# Patient Record
Sex: MALE | Race: WHITE
[De-identification: names, ages, dates, MRNs, and addresses within clinical notes are randomized per-mention and may not be internally consistent; named-entity substitution may affect disease eponyms.]

---

## 2018-07-02 ENCOUNTER — HOSPITAL ENCOUNTER (EMERGENCY)
Dept: HOSPITAL 80 - FED | Age: 68
Discharge: HOME | End: 2018-07-02
Payer: COMMERCIAL

## 2018-07-02 VITALS — SYSTOLIC BLOOD PRESSURE: 154 MMHG | DIASTOLIC BLOOD PRESSURE: 91 MMHG

## 2018-07-02 DIAGNOSIS — I48.91: ICD-10-CM

## 2018-07-02 DIAGNOSIS — Z79.01: ICD-10-CM

## 2018-07-02 DIAGNOSIS — I10: ICD-10-CM

## 2018-07-02 DIAGNOSIS — R55: Primary | ICD-10-CM

## 2018-07-02 DIAGNOSIS — E86.9: ICD-10-CM

## 2018-07-02 LAB — PLATELET # BLD: 204 10^3/UL (ref 150–400)

## 2018-07-02 NOTE — EDPHY
HPI/HX/ROS/PE/MDM


Narrative: 





CHIEF COMPLAINT: Near-syncope, atrial fib/flutter





HPI: The patient is an anticoagulated 68 y/o male with a history of atrial 

fibrillation arriving with his wife for evaluation of a near syncopal event 

this evening. He is visiting with his family from [place], [place] and went on a 3-

mile hike around the [facility] this morning without issue. This evening while 

in the hot tub, he began to feel lightheaded and like he would faint. His son 

told EMS the patient looked suddenly gray and the son pulled him out of the hot 

tub and helped him lie flat. The patient then felt diffusely weak and vomited 

once. EMS noted his rhythm was shifting between atrial fibrillation and flutter 

throughout transport. Upon arrival here, the patient feels completely back to 

baseline. He denies nausea, chest pain, dyspnea, fever, cough, urinary symptoms

, recent illness, recent trauma or any other complaints. He has never had 

symptoms like this previously. He reports he is complaint with his medications. 





REVIEW OF SYSTEMS:


Aside from elements discussed in the HPI, a comprehensive 10-point review of 

systems was reviewed and is negative.





PMH: Intermittent atrial fibrillation - Eliquis; hypertension - metoprolol





SOCIAL HISTORY: Wife at bedside. Nonsmoker. Visiting from [place], [place].





PHYSICAL EXAM:


General:Patient is alert, in no acute distress.


ENT:Eyes are normal to inspection.  ENT inspection normal.


Neck: Normal inspection.  Full range of motion.


Respiratory:No respiratory distress.  Breath sounds normal bilaterally.


Cardiovascular: Regular rate and rhythm.  Strong peripheral pulses.  Normal cap 

refill.


Abdomen:The abdomen is nontender to palpation. There are no peritoneal signs.


Back: Normal to inspection.  No tenderness to palpation.


Skin: Normal color.  No rash.  Warm and dry.


Extremities: Normal appearance.  Full range of motion.


Neuro: Oriented x3.  Normal motor function.  Normal sensory function.


ED Course: 


This is a 67 y/o male with a history of atrial fibrillation who is visiting 

from [place] and presents after a near-syncopal episode and vomiting while in the 

hot tub this evening. EMS noted he was in afib/aflutter during transport. He is 

currently asymptomatic and has an unremarkable exam. Plan for IV, labs 

including troponin, EKG, chest x-ray.





The 12 lead EKG was interpreted by myself. See hard copy and/or "tracemaster" 

electronic copy for interpretation.





Chest x-ray: negative. Labs including troponin are unremarkable.





Reassessed patient and discussed work up. He is in rate-controlled atrial 

fibrillation here, but continues to be asymptomatic. He feels comfortable going 

home and following up with his cardiologist upon his return to [place]. Return 

precautions discussed. He is comfortable with this plan. 


MDM: 





This patient presents with a near-syncopal event that appears multi-factorial.  

[place] went [place] hike earlier today in 

the hot weather, is in atrial fib/flutter and sat in a hot tub with likely 

resultant vasodilation and transient hypotension.  He is completely 

asmyptomatic in the ED and his vitals are normal.  I suspect his atrial fib/

flutter is likely playing a role, but his rate is well controlled and he is 

appropriately anti-coagulated.  We discussed option of cardioversion but he 

declines this.  I offered him admission to the hospital for further workup and 

observation but he declines this as well.  His family is comfortable with him 

being discharged.  I see no signs of PE, ACS, PTx, PNA, CHF, TAD. 





- Data Points


Imaging Results: 


 Imaging Impressions





Chest X-Ray  07/02/18 19:51


Impression: Chest negative for acute abnormality with findings noted as 

detailed above.











Imaging: I viewed and interpreted images myself


Laboratory Results: 


 Laboratory Results





 07/02/18 19:53 





 07/02/18 19:53 





 











  07/02/18 07/02/18 07/02/18





  19:56 19:53 19:53


 


WBC      6.96 10^3/uL 10^3/uL





     (3.80-9.50) 


 


RBC      5.52 10^6/uL 10^6/uL





     (4.40-6.38) 


 


Hgb      16.8 g/dL g/dL





     (13.7-17.5) 


 


Hct      49.3 % %





     (40.0-51.0) 


 


MCV      89.3 fL fL





     (81.5-99.8) 


 


MCH      30.4 pg pg





     (27.9-34.1) 


 


MCHC      34.1 g/dL g/dL





     (32.4-36.7) 


 


RDW      12.8 % %





     (11.5-15.2) 


 


Plt Count      204 10^3/uL 10^3/uL





     (150-400) 


 


MPV      10.7 fL fL





     (8.7-11.7) 


 


Neut % (Auto)      51.5 % %





     (39.3-74.2) 


 


Lymph % (Auto)      36.4 % %





     (15.0-45.0) 


 


Mono % (Auto)      8.9 % %





     (4.5-13.0) 


 


Eos % (Auto)      1.9 % %





     (0.6-7.6) 


 


Baso % (Auto)      0.9 % %





     (0.3-1.7) 


 


Nucleat RBC Rel Count      0.0 % %





     (0.0-0.2) 


 


Absolute Neuts (auto)      3.59 10^3/uL 10^3/uL





     (1.70-6.50) 


 


Absolute Lymphs (auto)      2.53 10^3/uL 10^3/uL





     (1.00-3.00) 


 


Absolute Monos (auto)      0.62 10^3/uL 10^3/uL





     (0.30-0.80) 


 


Absolute Eos (auto)      0.13 10^3/uL 10^3/uL





     (0.03-0.40) 


 


Absolute Basos (auto)      0.06 10^3/uL 10^3/uL





     (0.02-0.10) 


 


Absolute Nucleated RBC      0.00 10^3/uL 10^3/uL





     (0-0.01) 


 


Immature Gran %      0.4 % %





     (0.0-1.1) 


 


Immature Gran #      0.03 10^3/uL 10^3/uL





     (0.00-0.10) 


 


Sodium    143 mEq/L mEq/L  





    (135-145)  


 


Potassium    4.2 mEq/L mEq/L  





    (3.3-5.0)  


 


Chloride    104 mEq/L mEq/L  





    ()  


 


Carbon Dioxide    24 mEq/l mEq/l  





    (22-31)  


 


Anion Gap    15 mEq/L mEq/L  





    (8-16)  


 


BUN    15 mg/dL mg/dL  





    (7-23)  


 


Creatinine    1.0 mg/dL mg/dL  





    (0.7-1.3)  


 


Estimated GFR    > 60   





    


 


Glucose    169 mg/dL H mg/dL  





    ()  


 


Calcium    10.0 mg/dL mg/dL  





    (8.5-10.4)  


 


POC Troponin I  0.00 ng/mL ng/mL    





   (0.00-0.08)   











Medications Given: 


 








Discontinued Medications





Sodium Chloride (Ns)  1,000 mls @ 0 mls/hr IV EDNOW ONE; Wide Open


   PRN Reason: Protocol


   Stop: 07/02/18 19:51


   Last Admin: 07/02/18 19:55 Dose:  1,000 mls


Metoprolol Tartrate (Lopressor Injection)  5 mg IVP EDNOW ONE


   Stop: 07/02/18 19:56


   Last Admin: 07/02/18 20:12 Dose:  5 mg





Point of Care Test Results: 


 Chemistry











  07/02/18





  19:56


 


POC Troponin I  0.00 ng/mL ng/mL





   (0.00-0.08) 














General


Time Seen by Provider: 07/02/18 19:47


Initial Vital Signs: 


 Initial Vital Signs











Temperature (C)  37.1 C   07/02/18 19:45


 


Heart Rate  94   07/02/18 19:45


 


Respiratory Rate  18   07/02/18 19:45


 


Blood Pressure  136/90 H  07/02/18 19:45


 


O2 Sat (%)  94   07/02/18 19:45








 











O2 Delivery Mode               Room Air


 


O2 (L/minute)                  2














Allergies/Adverse Reactions: 


 





No Known Allergies Allergy (Unverified 07/02/18 19:56)


 








Home Medications: 














 Medication  Instructions  Recorded


 


Apixaban [Eliquis] 5 mg PO BID 07/02/18


 


Metoprolol Succinate 25 mg PO 07/02/18














Departure





- Departure


Disposition: Home, Routine, Self-Care


Clinical Impression: 


 Near syncope





Atrial fibrillation


Qualifiers:


 Atrial fibrillation type: unspecified Qualified Code(s): I48.91 - Unspecified 

atrial fibrillation





Condition: Good


Instructions:  A-fib (Atrial Fibrillation) (ED), Near Syncope (ED)


Additional Instructions: 


Follow up with your cardiologist upon your return home. Return to the ED for 

any recurrent or worsening symptoms. 


Referrals: 


Gilmar Lucas MD [Medical Doctor] - As per Instructions


Report Scribed for: Nico Sanchez


Report Scribed by: Shellie Aguirre


Date of Report: 07/02/18


Time of Report: 19:50


Physician Review and Approval Statement: Portions of this note were transcribed 

by an ED scribe.  I personally performed the history, physical exam, and 

medical decision making; and confirm the accuracy of the information in the 

transcribed note.

## 2018-07-02 NOTE — CPEKG
Heart Rate: 98

RR Interval: 612

QRSD Interval: 90

QT Interval: 384

QTC Interval: 491

QRS Axis: -19

T Wave Axis: 43

EKG Severity - ABNORMAL ECG -

EKG Impression: atrial flutter

EKG Impression: PAIRED VENTRICULAR PREMATURE COMPLEXES

EKG Impression: BORDERLINE LEFT AXIS DEVIATION

EKG Impression: BORDERLINE PROLONGED QT INTERVAL

Electronically Signed By: Nico Sanchez 02-Jul-2018 21:25:20